# Patient Record
Sex: MALE | Race: BLACK OR AFRICAN AMERICAN | NOT HISPANIC OR LATINO | Employment: FULL TIME | URBAN - METROPOLITAN AREA
[De-identification: names, ages, dates, MRNs, and addresses within clinical notes are randomized per-mention and may not be internally consistent; named-entity substitution may affect disease eponyms.]

---

## 2022-03-21 ENCOUNTER — OFFICE VISIT (OUTPATIENT)
Dept: PRIMARY CARE CLINIC | Facility: CLINIC | Age: 52
End: 2022-03-21
Payer: COMMERCIAL

## 2022-03-21 VITALS
DIASTOLIC BLOOD PRESSURE: 75 MMHG | HEART RATE: 113 BPM | BODY MASS INDEX: 35.95 KG/M2 | OXYGEN SATURATION: 95 % | SYSTOLIC BLOOD PRESSURE: 150 MMHG | TEMPERATURE: 98 F | HEIGHT: 69 IN | WEIGHT: 242.75 LBS

## 2022-03-21 DIAGNOSIS — I10 ELEVATED BLOOD PRESSURE READING IN OFFICE WITH DIAGNOSIS OF HYPERTENSION: ICD-10-CM

## 2022-03-21 DIAGNOSIS — Z80.0 FAMILY HISTORY OF COLON CANCER: ICD-10-CM

## 2022-03-21 DIAGNOSIS — B35.3 TINEA PEDIS OF BOTH FEET: ICD-10-CM

## 2022-03-21 DIAGNOSIS — K21.9 GASTROESOPHAGEAL REFLUX DISEASE, UNSPECIFIED WHETHER ESOPHAGITIS PRESENT: Primary | ICD-10-CM

## 2022-03-21 DIAGNOSIS — I10 ESSENTIAL HYPERTENSION: ICD-10-CM

## 2022-03-21 DIAGNOSIS — Z12.11 SCREENING FOR COLON CANCER: ICD-10-CM

## 2022-03-21 DIAGNOSIS — R01.1 HEART MURMUR: ICD-10-CM

## 2022-03-21 DIAGNOSIS — R00.0 SINUS TACHYCARDIA BY ELECTROCARDIOGRAM: ICD-10-CM

## 2022-03-21 DIAGNOSIS — R42 DIZZINESS: ICD-10-CM

## 2022-03-21 PROCEDURE — 99204 PR OFFICE/OUTPT VISIT, NEW, LEVL IV, 45-59 MIN: ICD-10-PCS | Mod: S$GLB,,, | Performed by: NURSE PRACTITIONER

## 2022-03-21 PROCEDURE — 99999 PR PBB SHADOW E&M-NEW PATIENT-LVL V: CPT | Mod: PBBFAC,,, | Performed by: NURSE PRACTITIONER

## 2022-03-21 PROCEDURE — 99999 PR PBB SHADOW E&M-NEW PATIENT-LVL V: ICD-10-PCS | Mod: PBBFAC,,, | Performed by: NURSE PRACTITIONER

## 2022-03-21 PROCEDURE — 1160F RVW MEDS BY RX/DR IN RCRD: CPT | Mod: CPTII,S$GLB,, | Performed by: NURSE PRACTITIONER

## 2022-03-21 PROCEDURE — 4010F ACE/ARB THERAPY RXD/TAKEN: CPT | Mod: CPTII,S$GLB,, | Performed by: NURSE PRACTITIONER

## 2022-03-21 PROCEDURE — 93010 EKG 12-LEAD: ICD-10-PCS | Mod: S$GLB,,, | Performed by: INTERNAL MEDICINE

## 2022-03-21 PROCEDURE — 99204 OFFICE O/P NEW MOD 45 MIN: CPT | Mod: S$GLB,,, | Performed by: NURSE PRACTITIONER

## 2022-03-21 PROCEDURE — 1159F PR MEDICATION LIST DOCUMENTED IN MEDICAL RECORD: ICD-10-PCS | Mod: CPTII,S$GLB,, | Performed by: NURSE PRACTITIONER

## 2022-03-21 PROCEDURE — 1159F MED LIST DOCD IN RCRD: CPT | Mod: CPTII,S$GLB,, | Performed by: NURSE PRACTITIONER

## 2022-03-21 PROCEDURE — 3077F SYST BP >= 140 MM HG: CPT | Mod: CPTII,S$GLB,, | Performed by: NURSE PRACTITIONER

## 2022-03-21 PROCEDURE — 3078F DIAST BP <80 MM HG: CPT | Mod: CPTII,S$GLB,, | Performed by: NURSE PRACTITIONER

## 2022-03-21 PROCEDURE — 93005 ELECTROCARDIOGRAM TRACING: CPT | Mod: S$GLB,,, | Performed by: NURSE PRACTITIONER

## 2022-03-21 PROCEDURE — 3008F BODY MASS INDEX DOCD: CPT | Mod: CPTII,S$GLB,, | Performed by: NURSE PRACTITIONER

## 2022-03-21 PROCEDURE — 3077F PR MOST RECENT SYSTOLIC BLOOD PRESSURE >= 140 MM HG: ICD-10-PCS | Mod: CPTII,S$GLB,, | Performed by: NURSE PRACTITIONER

## 2022-03-21 PROCEDURE — 3008F PR BODY MASS INDEX (BMI) DOCUMENTED: ICD-10-PCS | Mod: CPTII,S$GLB,, | Performed by: NURSE PRACTITIONER

## 2022-03-21 PROCEDURE — 3078F PR MOST RECENT DIASTOLIC BLOOD PRESSURE < 80 MM HG: ICD-10-PCS | Mod: CPTII,S$GLB,, | Performed by: NURSE PRACTITIONER

## 2022-03-21 PROCEDURE — 1160F PR REVIEW ALL MEDS BY PRESCRIBER/CLIN PHARMACIST DOCUMENTED: ICD-10-PCS | Mod: CPTII,S$GLB,, | Performed by: NURSE PRACTITIONER

## 2022-03-21 PROCEDURE — 93010 ELECTROCARDIOGRAM REPORT: CPT | Mod: S$GLB,,, | Performed by: INTERNAL MEDICINE

## 2022-03-21 PROCEDURE — 4010F PR ACE/ARB THEARPY RXD/TAKEN: ICD-10-PCS | Mod: CPTII,S$GLB,, | Performed by: NURSE PRACTITIONER

## 2022-03-21 PROCEDURE — 93005 EKG 12-LEAD: ICD-10-PCS | Mod: S$GLB,,, | Performed by: NURSE PRACTITIONER

## 2022-03-21 RX ORDER — AMLODIPINE BESYLATE 10 MG/1
10 TABLET ORAL DAILY
COMMUNITY
Start: 2021-10-15 | End: 2022-03-30 | Stop reason: SDUPTHER

## 2022-03-21 RX ORDER — LOSARTAN POTASSIUM 50 MG/1
50 TABLET ORAL DAILY
COMMUNITY
Start: 2021-10-15 | End: 2022-03-30 | Stop reason: SDUPTHER

## 2022-03-21 RX ORDER — IBUPROFEN 800 MG/1
800 TABLET ORAL 2 TIMES DAILY PRN
COMMUNITY
Start: 2021-09-30 | End: 2023-09-19

## 2022-03-21 RX ORDER — OMEPRAZOLE 20 MG/1
20 CAPSULE, DELAYED RELEASE ORAL DAILY
Qty: 30 CAPSULE | Refills: 11 | Status: SHIPPED | OUTPATIENT
Start: 2022-03-21 | End: 2022-03-30

## 2022-03-21 RX ORDER — DICYCLOMINE HYDROCHLORIDE 20 MG/1
TABLET ORAL
COMMUNITY
Start: 2022-03-20

## 2022-03-21 NOTE — PATIENT INSTRUCTIONS
If you experience any chest pain, chest tightness, shortness of breath, severe headache, or your blood pressure is greater than 180/90.    Recommend taking all medications as prescribed.

## 2022-03-21 NOTE — PROGRESS NOTES
Subjective:       Patient ID: Alfa Byrnes Sr. is a 51 y.o. male.    Chief Complaint: Hospital Follow Up     HPI     Mr. Byrnes is a 51 year old male patient that presents to clinic for hospital discharge follow up. Past medical history of hypertension, GERD, tinea pedis.  No past surgical history.  He has no PCP.  New to me.  Previously received primary care at [facility].  Chart review shows emergency room visit at [facility] on 2022 for a primary diagnosis of chest pain and GERD.  Patient reports he experienced stabbing pain that originated in his back and radiated to his chest and abdomen associated with dizziness and lightheadedness. Chart review shows workup was negative.  EKG normal sinus rhythm.  Troponins negative. He was prescribed dicyclomine and pantoprazole. He started purchased otc Nexium because cost of pantoprazole was too expensive.     Hypertension-- blood pressure elevated in clinic today. /75. tachycardia noted, . States he has not taken his Amlodipine 10 mg and Losartan 50 mg since he started taking Nexium two days ago. Denies chest pain, shortness of breath, or palpitations at presents.     Family history of colon cancer (father-). Last colonoscopy was eight years ago by Dr. Mancilla and polyps were found. Due for screening.     Reports tinea pedis bilaterally--treated with Tinactin.      ROS as listed.     Past Medical History:   Diagnosis Date    Essential (primary) hypertension     Gastroesophageal reflux disease without esophagitis     Tinea pedis of both feet       History reviewed. No pertinent surgical history.   Family History   Problem Relation Age of Onset    Colon cancer Father       Review of patient's allergies indicates:   Allergen Reactions    Penicillins Hives     Review of Systems   Cardiovascular: Negative for chest pain, palpitations and leg swelling.   Gastrointestinal: Positive for  "constipation (occassional). Negative for abdominal pain.   Genitourinary: Negative for difficulty urinating.       Objective:       Vitals:    03/21/22 1606 03/21/22 1657   BP: (!) 150/75    BP Location: Left arm    Patient Position: Sitting    BP Method: Thigh Cuff (Automatic)    Pulse: (!) 120 (!) 113   Temp: 98.4 °F (36.9 °C)    TempSrc: Oral    SpO2: 96% 95%   Weight: 110.1 kg (242 lb 11.6 oz)    Height: 5' 9" (1.753 m)       Physical Exam  Vitals and nursing note reviewed.   Constitutional:       General: He is not in acute distress.     Appearance: Normal appearance. He is obese. He is not toxic-appearing.   HENT:      Head: Normocephalic and atraumatic.      Mouth/Throat:      Comments: Mask in place  Eyes:      General: No scleral icterus.     Extraocular Movements: Extraocular movements intact.      Conjunctiva/sclera: Conjunctivae normal.      Pupils: Pupils are equal, round, and reactive to light.   Cardiovascular:      Rate and Rhythm: Regular rhythm. Tachycardia present.      Pulses:           Radial pulses are 2+ on the right side and 2+ on the left side.      Heart sounds: Murmur heard.   Pulmonary:      Effort: Pulmonary effort is normal.      Breath sounds: Normal breath sounds. No wheezing.   Abdominal:      General: Bowel sounds are normal.      Palpations: Abdomen is soft.      Tenderness: There is no abdominal tenderness.   Musculoskeletal:      Right lower leg: No edema.      Left lower leg: No edema.   Skin:     General: Skin is warm and dry.      Capillary Refill: Capillary refill takes less than 2 seconds.      Findings: No rash.   Neurological:      Mental Status: He is alert and oriented to person, place, and time.      Gait: Gait normal.   Psychiatric:         Mood and Affect: Mood normal.         Behavior: Behavior normal.         No results found for: WBC, HGB, HCT, PLT, CHOL, TRIG, HDL, LDLDIRECT, ALT, AST, NA, K, CL, CREATININE, BUN, CO2, TSH, PSA, INR, GLUF, HGBA1C, MICROALBUR "   Assessment:       1. Gastroesophageal reflux disease, unspecified whether esophagitis present    2. Elevated blood pressure reading in office with diagnosis of hypertension    3. Essential hypertension    4. Screening for colon cancer    5. Family history of colon cancer    6. Tinea pedis of both feet    7. Dizziness    8. Heart murmur    9. Sinus tachycardia by electrocardiogram        Plan:       Gastroesophageal reflux disease, unspecified whether esophagitis present  Discussed tips to reduce symptoms associated with GERD.   -     omeprazole (PRILOSEC) 20 MG capsule; Take 1 capsule (20 mg total) by mouth once daily. Thirty minutes prior to meal or drinking coffee or tea  Dispense: 30 capsule; Refill: 11    Elevated blood pressure reading in office with diagnosis of hypertension  -Monitor bp at home.   Return to clinic in 2 weeks for nurse visit for bp check.     Essential hypertension  Continue Amlodipine 10 mg po daily and Losartan 50 mg po daily.   Recommend DASH diet/ low sodium.     Screening for colon cancer  Family hx of colon cancer and personal history of polyps.   -     Case Request Endoscopy: COLONOSCOPY    Family history of colon cancer  -     Case Request Endoscopy: COLONOSCOPY    Tinea pedis of both feet    Dizziness  Patient recently had episodes of chest pain associated with dizziness. Will check echocardiogram and Holter monitor to rule out arrhythmia, valvular disease, or heart failure.   -     IN OFFICE EKG 12-LEAD (to Muse)--reviewed by me in clinic. Sinus tachycardia with premature atrial contractions. No ST elevation noted.   -     Echo Saline Bubble? No; Future; Expected date: 03/21/2022  -     Holter monitor - 48 hour; Future    Heart murmur  -     Echo Saline Bubble? No; Future; Expected date: 03/21/2022    Sinus tachycardia by electrocardiogram      Medication List with Changes/Refills   New Medications    OMEPRAZOLE (PRILOSEC) 20 MG CAPSULE    Take 1 capsule (20 mg total) by mouth once  daily. Thirty minutes prior to meal or drinking coffee or tea   Current Medications    AMLODIPINE (NORVASC) 10 MG TABLET    Take 10 mg by mouth once daily.    DICYCLOMINE (BENTYL) 20 MG TABLET    Take by mouth.    IBUPROFEN (ADVIL,MOTRIN) 800 MG TABLET    Take 800 mg by mouth 2 (two) times daily as needed.    LOSARTAN (COZAAR) 50 MG TABLET    Take 50 mg by mouth once daily.         Follow up in 1 week (on 3/28/2022), or establish care, for with Dr. Dennis or, with Dr. Pandey.     Kimber Reza, DNP, APRN, FNP-C

## 2022-03-30 ENCOUNTER — LAB VISIT (OUTPATIENT)
Dept: PRIMARY CARE CLINIC | Facility: CLINIC | Age: 52
End: 2022-03-30
Payer: COMMERCIAL

## 2022-03-30 ENCOUNTER — OFFICE VISIT (OUTPATIENT)
Dept: PRIMARY CARE CLINIC | Facility: CLINIC | Age: 52
End: 2022-03-30
Payer: COMMERCIAL

## 2022-03-30 VITALS
HEIGHT: 69 IN | RESPIRATION RATE: 18 BRPM | HEART RATE: 78 BPM | TEMPERATURE: 98 F | WEIGHT: 241.06 LBS | SYSTOLIC BLOOD PRESSURE: 132 MMHG | BODY MASS INDEX: 35.7 KG/M2 | DIASTOLIC BLOOD PRESSURE: 83 MMHG | OXYGEN SATURATION: 98 %

## 2022-03-30 DIAGNOSIS — Z12.11 SCREEN FOR COLON CANCER: ICD-10-CM

## 2022-03-30 DIAGNOSIS — Z23 NEED FOR SHINGLES VACCINE: ICD-10-CM

## 2022-03-30 DIAGNOSIS — Z80.0 FAMILY HISTORY OF COLON CANCER: ICD-10-CM

## 2022-03-30 DIAGNOSIS — I10 PRIMARY HYPERTENSION: ICD-10-CM

## 2022-03-30 DIAGNOSIS — Z11.59 NEED FOR HEPATITIS C SCREENING TEST: ICD-10-CM

## 2022-03-30 DIAGNOSIS — Z11.4 ENCOUNTER FOR SCREENING FOR HIV: ICD-10-CM

## 2022-03-30 DIAGNOSIS — K21.9 GASTROESOPHAGEAL REFLUX DISEASE WITHOUT ESOPHAGITIS: Primary | ICD-10-CM

## 2022-03-30 LAB
CHOLEST SERPL-MCNC: 142 MG/DL (ref 120–199)
CHOLEST/HDLC SERPL: 4.7 {RATIO} (ref 2–5)
ESTIMATED AVG GLUCOSE: 131 MG/DL (ref 68–131)
HBA1C MFR BLD: 6.2 % (ref 4–5.6)
HDLC SERPL-MCNC: 30 MG/DL (ref 40–75)
HDLC SERPL: 21.1 % (ref 20–50)
LDLC SERPL CALC-MCNC: 100.6 MG/DL (ref 63–159)
NONHDLC SERPL-MCNC: 112 MG/DL
TRIGL SERPL-MCNC: 57 MG/DL (ref 30–150)

## 2022-03-30 PROCEDURE — 3075F PR MOST RECENT SYSTOLIC BLOOD PRESS GE 130-139MM HG: ICD-10-PCS | Mod: CPTII,S$GLB,, | Performed by: FAMILY MEDICINE

## 2022-03-30 PROCEDURE — 3008F PR BODY MASS INDEX (BMI) DOCUMENTED: ICD-10-PCS | Mod: CPTII,S$GLB,, | Performed by: FAMILY MEDICINE

## 2022-03-30 PROCEDURE — 3079F PR MOST RECENT DIASTOLIC BLOOD PRESSURE 80-89 MM HG: ICD-10-PCS | Mod: CPTII,S$GLB,, | Performed by: FAMILY MEDICINE

## 2022-03-30 PROCEDURE — 3075F SYST BP GE 130 - 139MM HG: CPT | Mod: CPTII,S$GLB,, | Performed by: FAMILY MEDICINE

## 2022-03-30 PROCEDURE — 36415 PR COLLECTION VENOUS BLOOD,VENIPUNCTURE: ICD-10-PCS | Mod: S$GLB,,, | Performed by: FAMILY MEDICINE

## 2022-03-30 PROCEDURE — 83036 HEMOGLOBIN GLYCOSYLATED A1C: CPT | Performed by: FAMILY MEDICINE

## 2022-03-30 PROCEDURE — 1159F MED LIST DOCD IN RCRD: CPT | Mod: CPTII,S$GLB,, | Performed by: FAMILY MEDICINE

## 2022-03-30 PROCEDURE — 99999 PR PBB SHADOW E&M-EST. PATIENT-LVL III: ICD-10-PCS | Mod: PBBFAC,,, | Performed by: FAMILY MEDICINE

## 2022-03-30 PROCEDURE — 1160F RVW MEDS BY RX/DR IN RCRD: CPT | Mod: CPTII,S$GLB,, | Performed by: FAMILY MEDICINE

## 2022-03-30 PROCEDURE — 3008F BODY MASS INDEX DOCD: CPT | Mod: CPTII,S$GLB,, | Performed by: FAMILY MEDICINE

## 2022-03-30 PROCEDURE — 4010F ACE/ARB THERAPY RXD/TAKEN: CPT | Mod: CPTII,S$GLB,, | Performed by: FAMILY MEDICINE

## 2022-03-30 PROCEDURE — 99999 PR PBB SHADOW E&M-EST. PATIENT-LVL III: CPT | Mod: PBBFAC,,, | Performed by: FAMILY MEDICINE

## 2022-03-30 PROCEDURE — 3079F DIAST BP 80-89 MM HG: CPT | Mod: CPTII,S$GLB,, | Performed by: FAMILY MEDICINE

## 2022-03-30 PROCEDURE — 1159F PR MEDICATION LIST DOCUMENTED IN MEDICAL RECORD: ICD-10-PCS | Mod: CPTII,S$GLB,, | Performed by: FAMILY MEDICINE

## 2022-03-30 PROCEDURE — 86803 HEPATITIS C AB TEST: CPT | Performed by: FAMILY MEDICINE

## 2022-03-30 PROCEDURE — 99214 PR OFFICE/OUTPT VISIT, EST, LEVL IV, 30-39 MIN: ICD-10-PCS | Mod: S$GLB,,, | Performed by: FAMILY MEDICINE

## 2022-03-30 PROCEDURE — 1160F PR REVIEW ALL MEDS BY PRESCRIBER/CLIN PHARMACIST DOCUMENTED: ICD-10-PCS | Mod: CPTII,S$GLB,, | Performed by: FAMILY MEDICINE

## 2022-03-30 PROCEDURE — 87389 HIV-1 AG W/HIV-1&-2 AB AG IA: CPT | Performed by: FAMILY MEDICINE

## 2022-03-30 PROCEDURE — 80061 LIPID PANEL: CPT | Performed by: FAMILY MEDICINE

## 2022-03-30 PROCEDURE — 36415 COLL VENOUS BLD VENIPUNCTURE: CPT | Mod: S$GLB,,, | Performed by: FAMILY MEDICINE

## 2022-03-30 PROCEDURE — 99214 OFFICE O/P EST MOD 30 MIN: CPT | Mod: S$GLB,,, | Performed by: FAMILY MEDICINE

## 2022-03-30 PROCEDURE — 4010F PR ACE/ARB THEARPY RXD/TAKEN: ICD-10-PCS | Mod: CPTII,S$GLB,, | Performed by: FAMILY MEDICINE

## 2022-03-30 RX ORDER — LOSARTAN POTASSIUM 50 MG/1
50 TABLET ORAL DAILY
Qty: 90 TABLET | Refills: 1 | Status: SHIPPED | OUTPATIENT
Start: 2022-03-30 | End: 2023-09-19 | Stop reason: SDUPTHER

## 2022-03-30 RX ORDER — HYDROGEN PEROXIDE 3 %
40 SOLUTION, NON-ORAL MISCELLANEOUS
COMMUNITY
End: 2023-10-15

## 2022-03-30 RX ORDER — PANTOPRAZOLE SODIUM 20 MG/1
20 TABLET, DELAYED RELEASE ORAL
COMMUNITY
Start: 2022-03-20 | End: 2022-03-30

## 2022-03-30 RX ORDER — AMLODIPINE BESYLATE 10 MG/1
10 TABLET ORAL DAILY
Qty: 90 TABLET | Refills: 1 | Status: SHIPPED | OUTPATIENT
Start: 2022-03-30 | End: 2023-09-19

## 2022-03-30 RX ORDER — ZOSTER VACCINE RECOMBINANT, ADJUVANTED 50 MCG/0.5
0.5 KIT INTRAMUSCULAR ONCE
Qty: 1 EACH | Refills: 0 | Status: SHIPPED | OUTPATIENT
Start: 2022-03-30 | End: 2022-03-30

## 2022-03-30 NOTE — PROGRESS NOTES
Subjective:       Patient ID: Alfa Byrnes Sr. is a 51 y.o. male.    Chief Complaint: Establish Care    52 yo M pt, new to me, with PMH significant for HTN, GERD, and family history of colon cancer. He presents to [facility]. He gives h/o ED visit 2022 for CP and concern for MI.  ACS ruled out and pt diagnosed with GERD. Discharged with pantoprazole 40 mg--- not covered by insurance. He started taking OTC nexium 2 tabs daily with significant relief. Has not had GERD symptoms since starting Nexium. Has not been back to work. Feeling fine. Would like to return today.       Past Medical History:   Diagnosis Date    Essential (primary) hypertension     Gastroesophageal reflux disease without esophagitis     Tinea pedis of both feet        Patient Active Problem List   Diagnosis    Gastroesophageal reflux disease without esophagitis    Primary hypertension    Family history of colon cancer       Past Surgical History:   Procedure Laterality Date    NO PAST SURGERIES         Family History   Problem Relation Age of Onset    Hypertension Mother     Diabetes type II Mother     Colon cancer Father          at 49 yo       Social History     Tobacco Use   Smoking Status Never Smoker   Smokeless Tobacco Never Used       Social History     Social History Narrative    Tobacco: None    EtOH: 1-2x per week has 1-2 drinks     Drug: None    Employment: [employer] work    Education: 3 years of college    Lives with wife and 23 yo daughter     19 yo son is studying at [employer]    28 yo son in [place], [place]       Medications have been reviewed and reconciled.     Review of patient's allergies indicates:   Allergen Reactions    Penicillins Hives        Review of Systems   Constitutional: Negative for activity change, appetite change, chills, fever and unexpected weight change.   HENT: Negative for congestion, sinus pressure, sinus pain and sore throat.    Eyes: Negative for redness, itching and visual  "disturbance.   Respiratory: Negative for cough, shortness of breath and wheezing.    Cardiovascular: Negative for chest pain.   Gastrointestinal: Negative for abdominal pain, constipation, diarrhea, nausea and vomiting.   Genitourinary: Negative for difficulty urinating, dysuria, frequency, penile discharge and urgency.   Skin: Negative for rash.   Neurological: Negative for dizziness, syncope, weakness and headaches.   Psychiatric/Behavioral: Negative for dysphoric mood and suicidal ideas. The patient is not nervous/anxious.          Objective:        /83 (BP Location: Left arm, Patient Position: Sitting, BP Method: Large (Automatic))   Pulse 78   Temp 98.2 °F (36.8 °C) (Oral)   Resp 18   Ht 5' 9" (1.753 m)   Wt 109.4 kg (241 lb 1.2 oz)   SpO2 98%   BMI 35.60 kg/m²      Physical Exam  Constitutional:       General: He is not in acute distress.     Appearance: He is well-developed.   HENT:      Head: Normocephalic and atraumatic.      Right Ear: External ear normal.      Left Ear: External ear normal.   Eyes:      General: No scleral icterus.     Extraocular Movements: Extraocular movements intact.      Conjunctiva/sclera: Conjunctivae normal.   Cardiovascular:      Rate and Rhythm: Normal rate and regular rhythm.      Heart sounds: Normal heart sounds. No murmur heard.    No friction rub. No gallop.   Pulmonary:      Effort: Pulmonary effort is normal. No respiratory distress.      Breath sounds: Normal breath sounds. No wheezing or rales.   Musculoskeletal:         General: No tenderness or deformity. Normal range of motion.      Cervical back: Normal range of motion.      Right lower leg: No edema.      Left lower leg: No edema.   Skin:     General: Skin is warm and dry.      Findings: No erythema or rash.   Neurological:      Mental Status: He is alert and oriented to person, place, and time.      Cranial Nerves: No cranial nerve deficit.      Motor: No abnormal muscle tone.   Psychiatric:         " Mood and Affect: Mood normal.         Behavior: Behavior normal.         Assessment:       1. Gastroesophageal reflux disease without esophagitis    2. Primary hypertension    3. Family history of colon cancer    4. Screen for colon cancer    5. Need for hepatitis C screening test    6. Encounter for screening for HIV    7. Need for shingles vaccine        Plan:       Alfa was seen today for establish care.    Diagnoses and all orders for this visit:    Gastroesophageal reflux disease without esophagitis    Primary hypertension  -     amLODIPine (NORVASC) 10 MG tablet; Take 1 tablet (10 mg total) by mouth once daily.  -     losartan (COZAAR) 50 MG tablet; Take 1 tablet (50 mg total) by mouth once daily.  -     Hemoglobin A1C; Future  -     Lipid Panel; Future    Family history of colon cancer    Screen for colon cancer    Need for hepatitis C screening test  -     Hepatitis C Antibody; Future    Encounter for screening for HIV  -     HIV 1/2 Ag/Ab (4th Gen); Future  -     Case Request Endoscopy: COLONOSCOPY    Need for shingles vaccine  -     varicella-zoster [id number], PF, (SHINGRIX, PF,) 50 mcg/0.5 mL injection; Inject 0.5 mLs into the muscle once. for 1 dose    GERD  Anti-reflux diet/lifestyle modifications discussed  Advised Nexium 40 mg daily x 2-4 weeks and then step down to 1 tab daily  Note to return to work completed    HTN   BP at target   Continue amlodipine 10 mg daily + Losartan 50 mg daily   CBC and CMP normal 03/19/22  A1c and FLP today 03/30/22    Family h/o colonoscopy   Had initial C-scope at 44 yo; normal per pt  Will refer for C-scope today     HM   Hep C and HIV screens with fasting labs   Shigrix rx'd to pharmacy  Deferred Td/Tdap to next visit  COVID-19 booster encouraged    F/U 3-6 months pending lab results    I spent a total of 30 minutes on the day of the visit.This includes face to face time and non-face to face time preparing to see the patient (eg, review of tests), obtaining and/or  reviewing separately obtained history, documenting clinical information in the electronic or other health record, independently interpreting results and communicating results to the patient/family/caregiver, or care coordinator.

## 2022-03-30 NOTE — LETTER
March 30, 2022      [facility]  [address]  [facility] [address]  Phone: [phone]  Fax: [phone]       Patient: Alfa Byrnes   YOB: 1970  Date of Visit: 03/30/2022    To Whom It May Concern:    Alejandra Byrnes  was at [facility] on 03/30/2022. The patient may return to work today 03/30/2022 with no restrictions. If you have any questions or concerns, or if I can be of further assistance, please do not hesitate to contact me.    Sincerely,        Gonzalo Pandey MD

## 2022-03-31 LAB
HCV AB SERPL QL IA: NEGATIVE
HIV 1+2 AB+HIV1 P24 AG SERPL QL IA: NEGATIVE

## 2022-04-02 ENCOUNTER — PATIENT MESSAGE (OUTPATIENT)
Dept: PRIMARY CARE CLINIC | Facility: CLINIC | Age: 52
End: 2022-04-02
Payer: COMMERCIAL

## 2022-04-10 ENCOUNTER — PATIENT MESSAGE (OUTPATIENT)
Dept: PRIMARY CARE CLINIC | Facility: CLINIC | Age: 52
End: 2022-04-10
Payer: COMMERCIAL

## 2022-04-11 ENCOUNTER — HOSPITAL ENCOUNTER (OUTPATIENT)
Dept: CARDIOLOGY | Facility: HOSPITAL | Age: 52
Discharge: HOME | End: 2022-04-11
Attending: NURSE PRACTITIONER
Payer: COMMERCIAL

## 2022-04-11 VITALS
HEART RATE: 70 BPM | HEIGHT: 69 IN | BODY MASS INDEX: 35.7 KG/M2 | SYSTOLIC BLOOD PRESSURE: 158 MMHG | WEIGHT: 241 LBS | DIASTOLIC BLOOD PRESSURE: 88 MMHG

## 2022-04-11 DIAGNOSIS — R42 DIZZINESS: ICD-10-CM

## 2022-04-11 DIAGNOSIS — R01.1 HEART MURMUR: ICD-10-CM

## 2022-04-11 PROCEDURE — 93306 TTE W/DOPPLER COMPLETE: CPT | Mod: 26,,, | Performed by: INTERNAL MEDICINE

## 2022-04-11 PROCEDURE — 93306 TTE W/DOPPLER COMPLETE: CPT

## 2022-04-11 PROCEDURE — 93306 ECHO (CUPID ONLY): ICD-10-PCS | Mod: 26,,, | Performed by: INTERNAL MEDICINE

## 2022-04-12 LAB
ASCENDING AORTA: 4.56 CM
AV INDEX (PROSTH): 1.03
AV MEAN GRADIENT: 6 MMHG
AV PEAK GRADIENT: 11 MMHG
AV VALVE AREA: 4.18 CM2
AV VELOCITY RATIO: 0.93
BSA FOR ECHO PROCEDURE: 2.31 M2
CV ECHO LV RWT: 0.49 CM
DOP CALC AO PEAK VEL: 1.65 M/S
DOP CALC AO VTI: 29.44 CM
DOP CALC LVOT AREA: 4 CM2
DOP CALC LVOT DIAMETER: 2.27 CM
DOP CALC LVOT PEAK VEL: 1.54 M/S
DOP CALC LVOT STROKE VOLUME: 122.97 CM3
DOP CALCLVOT PEAK VEL VTI: 30.4 CM
E WAVE DECELERATION TIME: 254.38 MSEC
E/A RATIO: 0.83
E/E' RATIO: 5.3 M/S
ECHO LV POSTERIOR WALL: 0.91 CM (ref 0.6–1.1)
EJECTION FRACTION: 65 %
FRACTIONAL SHORTENING: 36 % (ref 28–44)
INTERVENTRICULAR SEPTUM: 1.21 CM (ref 0.6–1.1)
LA MAJOR: 3.99 CM
LA MINOR: 4.45 CM
LA WIDTH: 3.21 CM
LEFT ATRIUM SIZE: 3.08 CM
LEFT ATRIUM VOLUME INDEX MOD: 16.2 ML/M2
LEFT ATRIUM VOLUME INDEX: 15.8 ML/M2
LEFT ATRIUM VOLUME MOD: 36.29 CM3
LEFT ATRIUM VOLUME: 35.36 CM3
LEFT INTERNAL DIMENSION IN SYSTOLE: 2.36 CM (ref 2.1–4)
LEFT VENTRICLE DIASTOLIC VOLUME INDEX: 26.14 ML/M2
LEFT VENTRICLE DIASTOLIC VOLUME: 58.55 ML
LEFT VENTRICLE MASS INDEX: 55 G/M2
LEFT VENTRICLE SYSTOLIC VOLUME INDEX: 8.6 ML/M2
LEFT VENTRICLE SYSTOLIC VOLUME: 19.25 ML
LEFT VENTRICULAR INTERNAL DIMENSION IN DIASTOLE: 3.71 CM (ref 3.5–6)
LEFT VENTRICULAR MASS: 122.98 G
LV LATERAL E/E' RATIO: 3.79 M/S
LV SEPTAL E/E' RATIO: 8.83 M/S
MV A" WAVE DURATION": 9.99 MSEC
MV PEAK A VEL: 0.64 M/S
MV PEAK E VEL: 0.53 M/S
MV STENOSIS PRESSURE HALF TIME: 73.77 MS
MV VALVE AREA P 1/2 METHOD: 2.98 CM2
PISA TR MAX VEL: 1.88 M/S
PULM VEIN S/D RATIO: 1.3
PV PEAK D VEL: 0.3 M/S
PV PEAK S VEL: 0.39 M/S
RA MAJOR: 4 CM
RA WIDTH: 2.52 CM
RIGHT VENTRICULAR END-DIASTOLIC DIMENSION: 3.22 CM
RV TISSUE DOPPLER FREE WALL SYSTOLIC VELOCITY 1 (APICAL 4 CHAMBER VIEW): 9.14 CM/S
SINUS: 3.83 CM
STJ: 3.33 CM
TDI LATERAL: 0.14 M/S
TDI SEPTAL: 0.06 M/S
TDI: 0.1 M/S
TR MAX PG: 14 MMHG
TRICUSPID ANNULAR PLANE SYSTOLIC EXCURSION: 1.57 CM

## 2022-04-28 ENCOUNTER — TELEPHONE (OUTPATIENT)
Dept: PRIMARY CARE CLINIC | Facility: CLINIC | Age: 52
End: 2022-04-28
Payer: COMMERCIAL

## 2022-04-28 ENCOUNTER — PATIENT MESSAGE (OUTPATIENT)
Dept: PRIMARY CARE CLINIC | Facility: CLINIC | Age: 52
End: 2022-04-28
Payer: COMMERCIAL

## 2022-04-28 DIAGNOSIS — R01.1 MURMUR: ICD-10-CM

## 2022-04-28 DIAGNOSIS — R93.1 ABNORMAL ECHOCARDIOGRAM: Primary | ICD-10-CM

## 2022-05-10 ENCOUNTER — OFFICE VISIT (OUTPATIENT)
Dept: CARDIOLOGY | Facility: CLINIC | Age: 52
End: 2022-05-10
Payer: COMMERCIAL

## 2022-05-10 VITALS
SYSTOLIC BLOOD PRESSURE: 112 MMHG | HEIGHT: 69 IN | HEART RATE: 100 BPM | DIASTOLIC BLOOD PRESSURE: 78 MMHG | OXYGEN SATURATION: 98 % | BODY MASS INDEX: 35.68 KG/M2 | WEIGHT: 240.88 LBS

## 2022-05-10 DIAGNOSIS — R01.1 MURMUR: ICD-10-CM

## 2022-05-10 DIAGNOSIS — I10 PRIMARY HYPERTENSION: ICD-10-CM

## 2022-05-10 DIAGNOSIS — R93.1 ABNORMAL ECHOCARDIOGRAM: ICD-10-CM

## 2022-05-10 DIAGNOSIS — R94.31 NONSPECIFIC ABNORMAL ELECTROCARDIOGRAM (ECG) (EKG): ICD-10-CM

## 2022-05-10 DIAGNOSIS — R07.9 CHEST PAIN, UNSPECIFIED TYPE: Primary | ICD-10-CM

## 2022-05-10 PROCEDURE — 99999 PR PBB SHADOW E&M-EST. PATIENT-LVL III: CPT | Mod: PBBFAC,,, | Performed by: INTERNAL MEDICINE

## 2022-05-10 PROCEDURE — 99204 OFFICE O/P NEW MOD 45 MIN: CPT | Mod: S$GLB,,, | Performed by: INTERNAL MEDICINE

## 2022-05-10 PROCEDURE — 3008F BODY MASS INDEX DOCD: CPT | Mod: CPTII,S$GLB,, | Performed by: INTERNAL MEDICINE

## 2022-05-10 PROCEDURE — 3008F PR BODY MASS INDEX (BMI) DOCUMENTED: ICD-10-PCS | Mod: CPTII,S$GLB,, | Performed by: INTERNAL MEDICINE

## 2022-05-10 PROCEDURE — 3074F SYST BP LT 130 MM HG: CPT | Mod: CPTII,S$GLB,, | Performed by: INTERNAL MEDICINE

## 2022-05-10 PROCEDURE — 3074F PR MOST RECENT SYSTOLIC BLOOD PRESSURE < 130 MM HG: ICD-10-PCS | Mod: CPTII,S$GLB,, | Performed by: INTERNAL MEDICINE

## 2022-05-10 PROCEDURE — 4010F PR ACE/ARB THEARPY RXD/TAKEN: ICD-10-PCS | Mod: CPTII,S$GLB,, | Performed by: INTERNAL MEDICINE

## 2022-05-10 PROCEDURE — 1159F MED LIST DOCD IN RCRD: CPT | Mod: CPTII,S$GLB,, | Performed by: INTERNAL MEDICINE

## 2022-05-10 PROCEDURE — 3078F PR MOST RECENT DIASTOLIC BLOOD PRESSURE < 80 MM HG: ICD-10-PCS | Mod: CPTII,S$GLB,, | Performed by: INTERNAL MEDICINE

## 2022-05-10 PROCEDURE — 99999 PR PBB SHADOW E&M-EST. PATIENT-LVL III: ICD-10-PCS | Mod: PBBFAC,,, | Performed by: INTERNAL MEDICINE

## 2022-05-10 PROCEDURE — 99204 PR OFFICE/OUTPT VISIT, NEW, LEVL IV, 45-59 MIN: ICD-10-PCS | Mod: S$GLB,,, | Performed by: INTERNAL MEDICINE

## 2022-05-10 PROCEDURE — 1159F PR MEDICATION LIST DOCUMENTED IN MEDICAL RECORD: ICD-10-PCS | Mod: CPTII,S$GLB,, | Performed by: INTERNAL MEDICINE

## 2022-05-10 PROCEDURE — 4010F ACE/ARB THERAPY RXD/TAKEN: CPT | Mod: CPTII,S$GLB,, | Performed by: INTERNAL MEDICINE

## 2022-05-10 PROCEDURE — 3078F DIAST BP <80 MM HG: CPT | Mod: CPTII,S$GLB,, | Performed by: INTERNAL MEDICINE

## 2022-05-10 PROCEDURE — 3044F PR MOST RECENT HEMOGLOBIN A1C LEVEL <7.0%: ICD-10-PCS | Mod: CPTII,S$GLB,, | Performed by: INTERNAL MEDICINE

## 2022-05-10 PROCEDURE — 3044F HG A1C LEVEL LT 7.0%: CPT | Mod: CPTII,S$GLB,, | Performed by: INTERNAL MEDICINE

## 2022-05-10 NOTE — PROGRESS NOTES
Cardiology    5/10/2022  1:23 PM    Problem list  Patient Active Problem List   Diagnosis    Gastroesophageal reflux disease without esophagitis    Primary hypertension    Family history of colon cancer    Nonspecific abnormal electrocardiogram (ECG) (EKG)       CC:  Cp    HPI:  Referred for evaluation of chest pain.  On March 19, while driving, he felt pain started in his back and radiating around to his chest.  He went to [facility] ER.  He was evaluated with negative troponin.  He was given treatment for reflux which improved.  He had EKG done which showed abnormality with inferior Q-waves.  He was referred for further evaluation.  He does not exercise.  He denies any exertional chest pain.  He denies any dyspnea on exertion, paroxysmal nocturnal dyspnea, palpitation or syncope.  His blood pressure is well controlled.  He has no diabetes or hyperlipidemia.  She does not smoke.  He has no family history for premature coronary disease.    Medications  Current Outpatient Medications   Medication Sig Dispense Refill    amLODIPine (NORVASC) 10 MG tablet Take 1 tablet (10 mg total) by mouth once daily. 90 tablet 1    dicyclomine (BENTYL) 20 mg tablet Take by mouth.      esomeprazole (NEXIUM) 20 MG capsule Take 40 mg by mouth before breakfast. Taking OTC      ibuprofen (ADVIL,MOTRIN) 800 MG tablet Take 800 mg by mouth 2 (two) times daily as needed.      losartan (COZAAR) 50 MG tablet Take 1 tablet (50 mg total) by mouth once daily. 90 tablet 1     No current facility-administered medications for this visit.      Prior to Admission medications    Medication Sig Start Date End Date Taking? Authorizing Provider   amLODIPine (NORVASC) 10 MG tablet Take 1 tablet (10 mg total) by mouth once daily. 3/30/22  Yes Gonzalo Pandey MD   dicyclomine (BENTYL) 20 mg tablet Take by mouth. 3/20/22  Yes Historical Provider   esomeprazole (NEXIUM) 20 MG capsule Take 40 mg by mouth before breakfast. Taking OTC   Yes  Historical Provider   ibuprofen (ADVIL,MOTRIN) 800 MG tablet Take 800 mg by mouth 2 (two) times daily as needed. 9/30/21  Yes Historical Provider   losartan (COZAAR) 50 MG tablet Take 1 tablet (50 mg total) by mouth once daily. 3/30/22  Yes Gonzalo Pandey MD         History  Past Medical History:   Diagnosis Date    Essential (primary) hypertension     Gastroesophageal reflux disease without esophagitis     Tinea pedis of both feet      Past Surgical History:   Procedure Laterality Date    NO PAST SURGERIES       Social History     Socioeconomic History    Marital status:    Tobacco Use    Smoking status: Never Smoker    Smokeless tobacco: Never Used   Substance and Sexual Activity    Alcohol use: Yes     Comment: occasionally (1-2 x  per week; 2 drinks max)    Drug use: Never    Sexual activity: Not Currently     Partners: Female     Birth control/protection: None   Social History Narrative    Tobacco: None    EtOH: 1-2x per week has 1-2 drinks     Drug: None    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   HENT: Negative for congestion and nosebleeds.    Eyes: Negative for double vision, vision loss in left eye, vision loss in right eye and visual disturbance.   Cardiovascular: Positive for chest pain. Negative for claudication, cyanosis, dyspnea on exertion, irregular heartbeat, leg swelling, near-syncope, orthopnea, palpitations, paroxysmal nocturnal dyspnea and syncope.   Respiratory: Negative for cough, hemoptysis, shortness of breath, sleep disturbances due to breathing, snoring, sputum production and wheezing.    Endocrine: Negative for cold  intolerance and heat intolerance.   Skin: Negative for nail changes and rash.   Musculoskeletal: Negative for joint pain, muscle cramps, muscle weakness and myalgias.   Gastrointestinal: Negative for change in bowel habit, heartburn, hematemesis, hematochezia, hemorrhoids and melena.   Neurological: Negative for dizziness, focal weakness and headaches.         Physical Exam  Wt Readings from Last 1 Encounters:   05/10/22 109.2 kg (240 lb 13.6 oz)     BP Readings from Last 3 Encounters:   05/10/22 112/78   04/11/22 (!) 158/88   03/30/22 132/83     Pulse Readings from Last 1 Encounters:   05/10/22 100     Body mass index is 35.57 kg/m².    Physical Exam  Vitals reviewed.   Neck:      Vascular: No carotid bruit or JVD.   Cardiovascular:      Rate and Rhythm: Normal rate and regular rhythm.      Pulses:           Carotid pulses are 2+ on the right side and 2+ on the left side.       Radial pulses are 2+ on the right side and 2+ on the left side.      Heart sounds: Normal heart sounds, S1 normal and S2 normal.   Musculoskeletal:      Right lower leg: No edema.      Left lower leg: No edema.   Neurological:      Mental Status: He is alert.             Assessment  1. Abnormal echocardiogram  Inferior Q-waves  - Ambulatory referral/consult to Cardiology    2. Murmur  Stable  - Ambulatory referral/consult to Cardiology    3. Chest pain, unspecified type  Being evaluate  - Nuclear Stress - Cardiology Interpreted; Future    4. Nonspecific abnormal electrocardiogram (ECG) (EKG)  Being evaluate  - Nuclear Stress - Cardiology Interpreted; Future    5. Primary hypertension  Well controlled medications        Plan and Discussion  Discussed his atypical chest pain symptoms.  Discussed his abnormal EKG which showed inferior Q-waves.  Discussed proceeding with a treadmill nuclear stress test to evaluate his chest pain with abnormal EKG.  Discussed his echocardiogram results.    Follow Up  1 month      Corey Dennis MD, F.A.C.C,  JUNIE

## 2022-05-23 ENCOUNTER — HOSPITAL ENCOUNTER (OUTPATIENT)
Dept: RADIOLOGY | Facility: OTHER | Age: 52
Discharge: HOME | End: 2022-05-23
Attending: INTERNAL MEDICINE
Payer: COMMERCIAL

## 2022-05-23 ENCOUNTER — HOSPITAL ENCOUNTER (OUTPATIENT)
Dept: CARDIOLOGY | Facility: OTHER | Age: 52
Discharge: HOME | End: 2022-05-23
Attending: INTERNAL MEDICINE
Payer: COMMERCIAL

## 2022-05-23 DIAGNOSIS — R94.31 NONSPECIFIC ABNORMAL ELECTROCARDIOGRAM (ECG) (EKG): ICD-10-CM

## 2022-05-23 DIAGNOSIS — R07.9 CHEST PAIN, UNSPECIFIED TYPE: ICD-10-CM

## 2022-05-23 LAB
CV STRESS BASE HR: 71 BPM
DIASTOLIC BLOOD PRESSURE: 93 MMHG
NUC REST EJECTION FRACTION: 68
NUC STRESS EJECTION FRACTION: 74 %
OHS CV CPX 1 MINUTE RECOVERY HEART RATE: 133 BPM
OHS CV CPX 85 PERCENT MAX PREDICTED HEART RATE MALE: 143
OHS CV CPX ESTIMATED METS: 10
OHS CV CPX MAX PREDICTED HEART RATE: 168
OHS CV CPX PATIENT IS FEMALE: 0
OHS CV CPX PATIENT IS MALE: 1
OHS CV CPX PEAK DIASTOLIC BLOOD PRESSURE: 97 MMHG
OHS CV CPX PEAK HEAR RATE: 166 BPM
OHS CV CPX PEAK RATE PRESSURE PRODUCT: NORMAL
OHS CV CPX PEAK SYSTOLIC BLOOD PRESSURE: 213 MMHG
OHS CV CPX PERCENT MAX PREDICTED HEART RATE ACHIEVED: 99
OHS CV CPX RATE PRESSURE PRODUCT PRESENTING: NORMAL
STRESS ANGINA INDEX: 0
STRESS DUKE TREADMILL SCORE: 2
STRESS ECHO POST EXERCISE DUR MIN: 7 MINUTES
STRESS ECHO POST EXERCISE DUR SEC: 16 SECONDS
STRESS ST DEPRESSION: 1 MM
STRESS ST ELEVATION: 1 MM
SYSTOLIC BLOOD PRESSURE: 146 MMHG

## 2022-05-23 PROCEDURE — 78452 HT MUSCLE IMAGE SPECT MULT: CPT | Mod: 26,,, | Performed by: INTERNAL MEDICINE

## 2022-05-23 PROCEDURE — 78452 STRESS TEST WITH MYOCARDIAL PERFUSION (CUPID ONLY): ICD-10-PCS | Mod: 26,,, | Performed by: INTERNAL MEDICINE

## 2022-05-23 PROCEDURE — 93018 STRESS TEST WITH MYOCARDIAL PERFUSION (CUPID ONLY): ICD-10-PCS | Mod: ,,, | Performed by: INTERNAL MEDICINE

## 2022-05-23 PROCEDURE — A9500 TC99M SESTAMIBI: HCPCS

## 2022-05-23 PROCEDURE — 93016 STRESS TEST WITH MYOCARDIAL PERFUSION (CUPID ONLY): ICD-10-PCS | Mod: ,,, | Performed by: INTERNAL MEDICINE

## 2022-05-23 PROCEDURE — 93018 CV STRESS TEST I&R ONLY: CPT | Mod: ,,, | Performed by: INTERNAL MEDICINE

## 2022-05-23 PROCEDURE — 93016 CV STRESS TEST SUPVJ ONLY: CPT | Mod: ,,, | Performed by: INTERNAL MEDICINE

## 2022-05-25 ENCOUNTER — OFFICE VISIT (OUTPATIENT)
Dept: CARDIOLOGY | Facility: CLINIC | Age: 52
End: 2022-05-25
Payer: COMMERCIAL

## 2022-05-25 VITALS
DIASTOLIC BLOOD PRESSURE: 92 MMHG | BODY MASS INDEX: 36.24 KG/M2 | HEART RATE: 97 BPM | HEIGHT: 69 IN | SYSTOLIC BLOOD PRESSURE: 156 MMHG | WEIGHT: 244.69 LBS | OXYGEN SATURATION: 98 %

## 2022-05-25 DIAGNOSIS — R94.31 NONSPECIFIC ABNORMAL ELECTROCARDIOGRAM (ECG) (EKG): Primary | ICD-10-CM

## 2022-05-25 DIAGNOSIS — I10 PRIMARY HYPERTENSION: ICD-10-CM

## 2022-05-25 PROCEDURE — 1159F PR MEDICATION LIST DOCUMENTED IN MEDICAL RECORD: ICD-10-PCS | Mod: CPTII,S$GLB,, | Performed by: INTERNAL MEDICINE

## 2022-05-25 PROCEDURE — 99999 PR PBB SHADOW E&M-EST. PATIENT-LVL III: CPT | Mod: PBBFAC,,, | Performed by: INTERNAL MEDICINE

## 2022-05-25 PROCEDURE — 4010F ACE/ARB THERAPY RXD/TAKEN: CPT | Mod: CPTII,S$GLB,, | Performed by: INTERNAL MEDICINE

## 2022-05-25 PROCEDURE — 3044F PR MOST RECENT HEMOGLOBIN A1C LEVEL <7.0%: ICD-10-PCS | Mod: CPTII,S$GLB,, | Performed by: INTERNAL MEDICINE

## 2022-05-25 PROCEDURE — 3044F HG A1C LEVEL LT 7.0%: CPT | Mod: CPTII,S$GLB,, | Performed by: INTERNAL MEDICINE

## 2022-05-25 PROCEDURE — 3080F DIAST BP >= 90 MM HG: CPT | Mod: CPTII,S$GLB,, | Performed by: INTERNAL MEDICINE

## 2022-05-25 PROCEDURE — 99213 PR OFFICE/OUTPT VISIT, EST, LEVL III, 20-29 MIN: ICD-10-PCS | Mod: S$GLB,,, | Performed by: INTERNAL MEDICINE

## 2022-05-25 PROCEDURE — 3077F PR MOST RECENT SYSTOLIC BLOOD PRESSURE >= 140 MM HG: ICD-10-PCS | Mod: CPTII,S$GLB,, | Performed by: INTERNAL MEDICINE

## 2022-05-25 PROCEDURE — 99213 OFFICE O/P EST LOW 20 MIN: CPT | Mod: S$GLB,,, | Performed by: INTERNAL MEDICINE

## 2022-05-25 PROCEDURE — 99999 PR PBB SHADOW E&M-EST. PATIENT-LVL III: ICD-10-PCS | Mod: PBBFAC,,, | Performed by: INTERNAL MEDICINE

## 2022-05-25 PROCEDURE — 3008F BODY MASS INDEX DOCD: CPT | Mod: CPTII,S$GLB,, | Performed by: INTERNAL MEDICINE

## 2022-05-25 PROCEDURE — 4010F PR ACE/ARB THEARPY RXD/TAKEN: ICD-10-PCS | Mod: CPTII,S$GLB,, | Performed by: INTERNAL MEDICINE

## 2022-05-25 PROCEDURE — 3080F PR MOST RECENT DIASTOLIC BLOOD PRESSURE >= 90 MM HG: ICD-10-PCS | Mod: CPTII,S$GLB,, | Performed by: INTERNAL MEDICINE

## 2022-05-25 PROCEDURE — 3077F SYST BP >= 140 MM HG: CPT | Mod: CPTII,S$GLB,, | Performed by: INTERNAL MEDICINE

## 2022-05-25 PROCEDURE — 1159F MED LIST DOCD IN RCRD: CPT | Mod: CPTII,S$GLB,, | Performed by: INTERNAL MEDICINE

## 2022-05-25 PROCEDURE — 3008F PR BODY MASS INDEX (BMI) DOCUMENTED: ICD-10-PCS | Mod: CPTII,S$GLB,, | Performed by: INTERNAL MEDICINE

## 2022-05-25 NOTE — PROGRESS NOTES
Cardiology    5/25/2022  10:50 AM    Problem list  Patient Active Problem List   Diagnosis    Gastroesophageal reflux disease without esophagitis    Primary hypertension    Family history of colon cancer    Nonspecific abnormal electrocardiogram (ECG) (EKG)       CC:  f/u    HPI:  Is here for follow-up.  His nuclear stress test is negative for ischemia he does not monitor his blood pressure at home.  He just took his medications this morning and had breakfast.    Medications  Current Outpatient Medications   Medication Sig Dispense Refill    amLODIPine (NORVASC) 10 MG tablet Take 1 tablet (10 mg total) by mouth once daily. 90 tablet 1    dicyclomine (BENTYL) 20 mg tablet Take by mouth.      esomeprazole (NEXIUM) 20 MG capsule Take 40 mg by mouth before breakfast. Taking OTC      ibuprofen (ADVIL,MOTRIN) 800 MG tablet Take 800 mg by mouth 2 (two) times daily as needed.      losartan (COZAAR) 50 MG tablet Take 1 tablet (50 mg total) by mouth once daily. 90 tablet 1     No current facility-administered medications for this visit.      Prior to Admission medications    Medication Sig Start Date End Date Taking? Authorizing Provider   amLODIPine (NORVASC) 10 MG tablet Take 1 tablet (10 mg total) by mouth once daily. 3/30/22  Yes Gonzalo Pandey MD   dicyclomine (BENTYL) 20 mg tablet Take by mouth. 3/20/22  Yes Historical Provider   esomeprazole (NEXIUM) 20 MG capsule Take 40 mg by mouth before breakfast. Taking OTC   Yes Historical Provider   ibuprofen (ADVIL,MOTRIN) 800 MG tablet Take 800 mg by mouth 2 (two) times daily as needed. 9/30/21  Yes Historical Provider   losartan (COZAAR) 50 MG tablet Take 1 tablet (50 mg total) by mouth once daily. 3/30/22  Yes Gonzalo Pandey MD         History  Past Medical History:   Diagnosis Date    Essential (primary) hypertension     Gastroesophageal reflux disease without esophagitis     Tinea pedis of both feet      Past Surgical History:   Procedure  Laterality Date    NO PAST SURGERIES       Social History     Socioeconomic History    Marital status:    Tobacco Use    Smoking status: Never Smoker    Smokeless tobacco: Never Used   Substance and Sexual Activity    Alcohol use: Yes     Comment: occasionally (1-2 x  per week; 2 drinks max)    Drug use: Never    Sexual activity: Not Currently     Partners: Female     Birth control/protection: None   Social History Narrative    Tobacco: None    EtOH: 1-2x per week has 1-2 drinks     Drug: None    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   HENT: Negative for congestion and nosebleeds.    Eyes: Negative for double vision, vision loss in left eye, vision loss in right eye and visual disturbance.   Cardiovascular: Negative for chest pain, claudication, cyanosis, dyspnea on exertion, irregular heartbeat, leg swelling, near-syncope, orthopnea, palpitations, paroxysmal nocturnal dyspnea and syncope.   Respiratory: Negative for cough, hemoptysis, shortness of breath, sleep disturbances due to breathing, snoring, sputum production and wheezing.    Endocrine: Negative for cold intolerance and heat intolerance.   Skin: Negative for nail changes and rash.   Musculoskeletal: Negative for joint pain, muscle cramps, muscle weakness and myalgias.   Gastrointestinal: Negative for change in bowel habit, heartburn, hematemesis, hematochezia, hemorrhoids and melena.   Neurological: Negative for dizziness, focal weakness and headaches.         Physical Exam  Wt Readings from Last 1 Encounters:   05/25/22 111 kg (244 lb 11.2 oz)     BP Readings from Last 3 Encounters:   05/25/22  (!) 156/92   05/10/22 112/78   04/11/22 (!) 158/88     Pulse Readings from Last 1 Encounters:   05/25/22 97     Body mass index is 36.14 kg/m².    Physical Exam  Vitals reviewed.   Neck:      Vascular: No carotid bruit or JVD.   Cardiovascular:      Rate and Rhythm: Normal rate and regular rhythm.      Pulses:           Carotid pulses are 2+ on the right side and 2+ on the left side.       Radial pulses are 2+ on the right side and 2+ on the left side.      Heart sounds: Normal heart sounds, S1 normal and S2 normal.   Musculoskeletal:      Right lower leg: No edema.      Left lower leg: No edema.   Neurological:      Mental Status: He is alert.             Assessment  1. Nonspecific abnormal electrocardiogram (ECG) (EKG)  Stable    2. Primary hypertension  Not at goal        Plan and Discussion  Discussed that his blood pressure is not controlled.  Recommended to increase losartan to 100 mg daily.  However he does not want to make any changes at present and wants to continue losartan 50 mg daily.  Recommend to monitor blood pressure daily and follow-up with PCP.    Follow Up  PRN      Corey Dennis MD, F.A.C.C, F.S.C.A.I.

## 2022-09-14 ENCOUNTER — PATIENT OUTREACH (OUTPATIENT)
Dept: ADMINISTRATIVE | Facility: HOSPITAL | Age: 52
End: 2022-09-14
Payer: COMMERCIAL

## 2022-10-13 DIAGNOSIS — I10 PRIMARY HYPERTENSION: ICD-10-CM

## 2022-10-29 DIAGNOSIS — Z80.0 FAMILY HISTORY OF COLON CANCER: Primary | ICD-10-CM

## 2023-09-19 ENCOUNTER — LAB VISIT (OUTPATIENT)
Dept: PRIMARY CARE CLINIC | Facility: CLINIC | Age: 53
End: 2023-09-19
Payer: COMMERCIAL

## 2023-09-19 ENCOUNTER — OFFICE VISIT (OUTPATIENT)
Dept: PRIMARY CARE CLINIC | Facility: CLINIC | Age: 53
End: 2023-09-19
Payer: COMMERCIAL

## 2023-09-19 VITALS
SYSTOLIC BLOOD PRESSURE: 152 MMHG | BODY MASS INDEX: 36.85 KG/M2 | WEIGHT: 249.56 LBS | HEART RATE: 83 BPM | DIASTOLIC BLOOD PRESSURE: 86 MMHG

## 2023-09-19 DIAGNOSIS — M79.89 SWELLING OF LEFT HAND: Primary | ICD-10-CM

## 2023-09-19 DIAGNOSIS — Z00.00 ANNUAL PHYSICAL EXAM: ICD-10-CM

## 2023-09-19 DIAGNOSIS — E66.01 SEVERE OBESITY (BMI 35.0-39.9) WITH COMORBIDITY: ICD-10-CM

## 2023-09-19 DIAGNOSIS — I10 PRIMARY HYPERTENSION: ICD-10-CM

## 2023-09-19 DIAGNOSIS — M79.89 SWELLING OF LEFT HAND: ICD-10-CM

## 2023-09-19 LAB
ALBUMIN SERPL BCP-MCNC: 3.7 G/DL (ref 3.5–5.2)
ALP SERPL-CCNC: 73 U/L (ref 55–135)
ALT SERPL W/O P-5'-P-CCNC: 16 U/L (ref 10–44)
ANION GAP SERPL CALC-SCNC: 10 MMOL/L (ref 8–16)
AST SERPL-CCNC: 17 U/L (ref 10–40)
BILIRUB SERPL-MCNC: 0.8 MG/DL (ref 0.1–1)
BUN SERPL-MCNC: 19 MG/DL (ref 6–20)
CALCIUM SERPL-MCNC: 9.8 MG/DL (ref 8.7–10.5)
CHLORIDE SERPL-SCNC: 107 MMOL/L (ref 95–110)
CHOLEST SERPL-MCNC: 134 MG/DL (ref 120–199)
CHOLEST/HDLC SERPL: 3.8 {RATIO} (ref 2–5)
CO2 SERPL-SCNC: 23 MMOL/L (ref 23–29)
CREAT SERPL-MCNC: 1.4 MG/DL (ref 0.5–1.4)
EST. GFR  (NO RACE VARIABLE): 59.7 ML/MIN/1.73 M^2
ESTIMATED AVG GLUCOSE: 134 MG/DL (ref 68–131)
GLUCOSE SERPL-MCNC: 98 MG/DL (ref 70–110)
HBA1C MFR BLD: 6.3 % (ref 4–5.6)
HDLC SERPL-MCNC: 35 MG/DL (ref 40–75)
HDLC SERPL: 26.1 % (ref 20–50)
LDLC SERPL CALC-MCNC: 88 MG/DL (ref 63–159)
NONHDLC SERPL-MCNC: 99 MG/DL
POTASSIUM SERPL-SCNC: 4 MMOL/L (ref 3.5–5.1)
PROT SERPL-MCNC: 7.5 G/DL (ref 6–8.4)
SODIUM SERPL-SCNC: 140 MMOL/L (ref 136–145)
TRIGL SERPL-MCNC: 55 MG/DL (ref 30–150)
URATE SERPL-MCNC: 9.8 MG/DL (ref 3.4–7)

## 2023-09-19 PROCEDURE — 1159F PR MEDICATION LIST DOCUMENTED IN MEDICAL RECORD: ICD-10-PCS | Mod: CPTII,S$GLB,, | Performed by: FAMILY MEDICINE

## 2023-09-19 PROCEDURE — 80061 LIPID PANEL: CPT | Performed by: FAMILY MEDICINE

## 2023-09-19 PROCEDURE — 3008F BODY MASS INDEX DOCD: CPT | Mod: CPTII,S$GLB,, | Performed by: FAMILY MEDICINE

## 2023-09-19 PROCEDURE — 83036 HEMOGLOBIN GLYCOSYLATED A1C: CPT | Performed by: FAMILY MEDICINE

## 2023-09-19 PROCEDURE — 99999 PR PBB SHADOW E&M-EST. PATIENT-LVL III: CPT | Mod: PBBFAC,,, | Performed by: FAMILY MEDICINE

## 2023-09-19 PROCEDURE — 84550 ASSAY OF BLOOD/URIC ACID: CPT | Performed by: FAMILY MEDICINE

## 2023-09-19 PROCEDURE — 3077F PR MOST RECENT SYSTOLIC BLOOD PRESSURE >= 140 MM HG: ICD-10-PCS | Mod: CPTII,S$GLB,, | Performed by: FAMILY MEDICINE

## 2023-09-19 PROCEDURE — 4010F ACE/ARB THERAPY RXD/TAKEN: CPT | Mod: CPTII,S$GLB,, | Performed by: FAMILY MEDICINE

## 2023-09-19 PROCEDURE — 80053 COMPREHEN METABOLIC PANEL: CPT | Performed by: FAMILY MEDICINE

## 2023-09-19 PROCEDURE — 3079F DIAST BP 80-89 MM HG: CPT | Mod: CPTII,S$GLB,, | Performed by: FAMILY MEDICINE

## 2023-09-19 PROCEDURE — 3008F PR BODY MASS INDEX (BMI) DOCUMENTED: ICD-10-PCS | Mod: CPTII,S$GLB,, | Performed by: FAMILY MEDICINE

## 2023-09-19 PROCEDURE — 99214 OFFICE O/P EST MOD 30 MIN: CPT | Mod: S$GLB,,, | Performed by: FAMILY MEDICINE

## 2023-09-19 PROCEDURE — 99214 PR OFFICE/OUTPT VISIT, EST, LEVL IV, 30-39 MIN: ICD-10-PCS | Mod: S$GLB,,, | Performed by: FAMILY MEDICINE

## 2023-09-19 PROCEDURE — 99999 PR PBB SHADOW E&M-EST. PATIENT-LVL III: ICD-10-PCS | Mod: PBBFAC,,, | Performed by: FAMILY MEDICINE

## 2023-09-19 PROCEDURE — 36415 COLL VENOUS BLD VENIPUNCTURE: CPT | Performed by: FAMILY MEDICINE

## 2023-09-19 PROCEDURE — 1159F MED LIST DOCD IN RCRD: CPT | Mod: CPTII,S$GLB,, | Performed by: FAMILY MEDICINE

## 2023-09-19 PROCEDURE — 4010F PR ACE/ARB THEARPY RXD/TAKEN: ICD-10-PCS | Mod: CPTII,S$GLB,, | Performed by: FAMILY MEDICINE

## 2023-09-19 PROCEDURE — 3077F SYST BP >= 140 MM HG: CPT | Mod: CPTII,S$GLB,, | Performed by: FAMILY MEDICINE

## 2023-09-19 PROCEDURE — 3079F PR MOST RECENT DIASTOLIC BLOOD PRESSURE 80-89 MM HG: ICD-10-PCS | Mod: CPTII,S$GLB,, | Performed by: FAMILY MEDICINE

## 2023-09-19 RX ORDER — NAPROXEN 500 MG/1
500 TABLET ORAL 2 TIMES DAILY PRN
Qty: 60 TABLET | Refills: 3 | Status: SHIPPED | OUTPATIENT
Start: 2023-09-19 | End: 2023-10-12

## 2023-09-19 RX ORDER — LOSARTAN POTASSIUM 50 MG/1
50 TABLET ORAL DAILY
Qty: 90 TABLET | Refills: 1 | Status: SHIPPED | OUTPATIENT
Start: 2023-09-19

## 2023-09-19 NOTE — PROGRESS NOTES
Clinic Note  9/19/2023      Subjective:       Patient ID:  Alfa is a 53 y.o. male being seen for an established visit.    Chief Complaint: Left elbow and L hand pain/swelling     Left hand swelling and left elbow swelling-over last week patient reports left hand and left elbow pain and swelling.  Patient has been taking ibuprofen consistently over the course of 4 days which have helped with the pain and some of the swelling.  However still having residual swelling, also using cold compresses.  Denies any fever, chills, rashes.  No swelling of any other joints    Hypertension-is prescribed losartan and amlodipine.  However has ED with these medications so not taking the medication anymore.  Denies any chest pain or dyspnea.      Review of Systems   Constitutional:  Negative for chills, fever, malaise/fatigue and weight loss.   HENT:  Negative for congestion, sinus pain and sore throat.    Respiratory:  Negative for cough, shortness of breath and wheezing.    Cardiovascular:  Negative for chest pain and palpitations.   Gastrointestinal:  Negative for constipation, diarrhea, nausea and vomiting.   Genitourinary:  Negative for dysuria, frequency and urgency.   Musculoskeletal:  Negative for myalgias.   Skin:  Negative for rash.   Neurological:  Negative for headaches.       Medication List with Changes/Refills   Current Medications    AMLODIPINE (NORVASC) 10 MG TABLET    Take 1 tablet (10 mg total) by mouth once daily.    DICYCLOMINE (BENTYL) 20 MG TABLET    Take by mouth.    ESOMEPRAZOLE (NEXIUM) 20 MG CAPSULE    Take 40 mg by mouth before breakfast. Taking OTC    IBUPROFEN (ADVIL,MOTRIN) 800 MG TABLET    Take 800 mg by mouth 2 (two) times daily as needed.    LOSARTAN (COZAAR) 50 MG TABLET    Take 1 tablet (50 mg total) by mouth once daily.       Patient Active Problem List   Diagnosis    Gastroesophageal reflux disease without esophagitis    Primary hypertension    Family history of colon cancer    Nonspecific  "abnormal electrocardiogram (ECG) (EKG)           Objective:      BP (!) 152/86 (BP Location: Right arm, Patient Position: Sitting)   Pulse 83   Wt 113.2 kg (249 lb 9 oz)   BMI 36.85 kg/m²   Estimated body mass index is 36.85 kg/m² as calculated from the following:    Height as of 5/25/22: 5' 9" (1.753 m).    Weight as of this encounter: 113.2 kg (249 lb 9 oz).  Physical Exam  Vitals reviewed.   Constitutional:       General: He is not in acute distress.     Appearance: He is obese. He is not diaphoretic.   HENT:      Head: Normocephalic and atraumatic.   Eyes:      Conjunctiva/sclera: Conjunctivae normal.   Cardiovascular:      Rate and Rhythm: Normal rate and regular rhythm.      Heart sounds: Normal heart sounds.   Pulmonary:      Effort: Pulmonary effort is normal. No respiratory distress.      Breath sounds: Normal breath sounds. No wheezing.   Abdominal:      General: Bowel sounds are normal.      Palpations: Abdomen is soft.   Musculoskeletal:         General: Normal range of motion.        Hands:       Cervical back: Normal range of motion.   Skin:     General: Skin is warm and dry.      Findings: No erythema or rash.   Neurological:      Mental Status: He is alert and oriented to person, place, and time.   Psychiatric:         Mood and Affect: Mood and affect normal.         Behavior: Behavior normal.         Thought Content: Thought content normal.         Judgment: Judgment normal.         Assessment and Plan:     1. Swelling of left hand  - symptoms suspicious for gout, lower suspicion for infectious etiology, osteoarthritis  - naproxen (NAPROSYN) 500 MG tablet; Take 1 tablet (500 mg total) by mouth 2 (two) times daily as needed (pain (take with food. do not take together with ibuprofen)).  Dispense: 60 tablet; Refill: 3  - Uric Acid; Future    2. Primary hypertension  - blood pressure not controlled but patient concerned about ED side effects, will restart patient on losartan, patient will defer " amlodipine at this time.  Patient come back in 2 weeks for nursing blood pressure check and BMP  - losartan (COZAAR) 50 MG tablet; Take 1 tablet (50 mg total) by mouth once daily. High blood pressure  Dispense: 90 tablet; Refill: 1    4. Annual physical exam  - Comprehensive Metabolic Panel; Future  - Lipid Panel; Future  - Hemoglobin A1C; Future        Follow Up:   No follow-ups on file.    Other Orders Placed This Visit:  No orders of the defined types were placed in this encounter.        Uri Dennis MD        This note is dictated on M*Modal word recognition program.  There are word recognition mistakes that are occasionally missed on review.

## 2023-10-05 DIAGNOSIS — E78.5 HYPERLIPIDEMIA, UNSPECIFIED HYPERLIPIDEMIA TYPE: Primary | ICD-10-CM

## 2023-10-05 RX ORDER — ROSUVASTATIN CALCIUM 10 MG/1
10 TABLET, COATED ORAL DAILY
Qty: 90 TABLET | Refills: 3 | Status: SHIPPED | OUTPATIENT
Start: 2023-10-05 | End: 2024-10-04

## 2023-10-12 ENCOUNTER — OFFICE VISIT (OUTPATIENT)
Dept: PRIMARY CARE CLINIC | Facility: CLINIC | Age: 53
End: 2023-10-12
Payer: COMMERCIAL

## 2023-10-12 VITALS
OXYGEN SATURATION: 98 % | DIASTOLIC BLOOD PRESSURE: 81 MMHG | HEART RATE: 91 BPM | HEIGHT: 69 IN | SYSTOLIC BLOOD PRESSURE: 137 MMHG | WEIGHT: 245.06 LBS | BODY MASS INDEX: 36.3 KG/M2

## 2023-10-12 DIAGNOSIS — M10.9 ACUTE GOUT OF MULTIPLE SITES, UNSPECIFIED CAUSE: ICD-10-CM

## 2023-10-12 DIAGNOSIS — I10 PRIMARY HYPERTENSION: ICD-10-CM

## 2023-10-12 DIAGNOSIS — K92.2 ACUTE GI BLEEDING: ICD-10-CM

## 2023-10-12 DIAGNOSIS — Z09 HOSPITAL DISCHARGE FOLLOW-UP: Primary | ICD-10-CM

## 2023-10-12 PROCEDURE — 3044F HG A1C LEVEL LT 7.0%: CPT | Mod: CPTII,S$GLB,, | Performed by: NURSE PRACTITIONER

## 2023-10-12 PROCEDURE — 3079F DIAST BP 80-89 MM HG: CPT | Mod: CPTII,S$GLB,, | Performed by: NURSE PRACTITIONER

## 2023-10-12 PROCEDURE — 99496 TRANSITIONAL CARE MANAGE SERVICE 7 DAY DISCHARGE: ICD-10-PCS | Mod: S$GLB,,, | Performed by: NURSE PRACTITIONER

## 2023-10-12 PROCEDURE — 99999 PR PBB SHADOW E&M-EST. PATIENT-LVL IV: CPT | Mod: PBBFAC,,, | Performed by: NURSE PRACTITIONER

## 2023-10-12 PROCEDURE — 1159F PR MEDICATION LIST DOCUMENTED IN MEDICAL RECORD: ICD-10-PCS | Mod: CPTII,S$GLB,, | Performed by: NURSE PRACTITIONER

## 2023-10-12 PROCEDURE — 1159F MED LIST DOCD IN RCRD: CPT | Mod: CPTII,S$GLB,, | Performed by: NURSE PRACTITIONER

## 2023-10-12 PROCEDURE — 4010F PR ACE/ARB THEARPY RXD/TAKEN: ICD-10-PCS | Mod: CPTII,S$GLB,, | Performed by: NURSE PRACTITIONER

## 2023-10-12 PROCEDURE — 3075F PR MOST RECENT SYSTOLIC BLOOD PRESS GE 130-139MM HG: ICD-10-PCS | Mod: CPTII,S$GLB,, | Performed by: NURSE PRACTITIONER

## 2023-10-12 PROCEDURE — 4010F ACE/ARB THERAPY RXD/TAKEN: CPT | Mod: CPTII,S$GLB,, | Performed by: NURSE PRACTITIONER

## 2023-10-12 PROCEDURE — 3044F PR MOST RECENT HEMOGLOBIN A1C LEVEL <7.0%: ICD-10-PCS | Mod: CPTII,S$GLB,, | Performed by: NURSE PRACTITIONER

## 2023-10-12 PROCEDURE — 99999 PR PBB SHADOW E&M-EST. PATIENT-LVL IV: ICD-10-PCS | Mod: PBBFAC,,, | Performed by: NURSE PRACTITIONER

## 2023-10-12 PROCEDURE — 99496 TRANSJ CARE MGMT HIGH F2F 7D: CPT | Mod: S$GLB,,, | Performed by: NURSE PRACTITIONER

## 2023-10-12 PROCEDURE — 3079F PR MOST RECENT DIASTOLIC BLOOD PRESSURE 80-89 MM HG: ICD-10-PCS | Mod: CPTII,S$GLB,, | Performed by: NURSE PRACTITIONER

## 2023-10-12 PROCEDURE — 3075F SYST BP GE 130 - 139MM HG: CPT | Mod: CPTII,S$GLB,, | Performed by: NURSE PRACTITIONER

## 2023-10-12 RX ORDER — OMEPRAZOLE 40 MG/1
40 CAPSULE, DELAYED RELEASE ORAL 2 TIMES DAILY
COMMUNITY
Start: 2023-10-07

## 2023-10-12 RX ORDER — COLCHICINE 0.6 MG/1
0.6 TABLET ORAL
COMMUNITY
Start: 2023-10-11 | End: 2023-12-07

## 2023-10-12 NOTE — PROGRESS NOTES
Subjective:       Patient ID: Alfa Byrnes Sr is a 53 y.o. male.    Transitional Care Note    Family and/or Caretaker present at visit?  No.  Diagnostic tests reviewed/disposition: No diagnosic tests pending after this hospitalization.  Disease/illness education: Yes  Home health/community services discussion/referrals: Patient does not have home health established from hospital visit.  They do not need home health.  If needed, we will set up home health for the patient.   Establishment or re-establishment of referral orders for community resources: No other necessary community resources.   Discussion with other health care providers: No discussion with other health care providers necessary.              Chief Complaint: Hospital Follow Up    Mr. lAfa Byrnes is a 53 year old male, new to me, presents to the clinic for hospital discharge follow up. Former patient of Gonzalo Pandey.     [facility]  Date of admission: 10/8/2023  Date of discharge: 10/11/23    Presented to emergency room with complaints bright red blood per rectum and syncope.  Prior admission 10/06 EGD revealed erosive gastropathy and duodenal erosion.  During recent encounter, Protonix drip initiated, 2 units PRBCs administered, colonoscopy 10/09/2023 evidence of blood and noted diverticulosis.  Discharged on omeprazole 40 mg, 2 times daily, instructed to avoid NSAIDs, previously prescribed for gout, in lieu of GI bleed.      Past Medical History:   Diagnosis Date    Essential (primary) hypertension     Gastroesophageal reflux disease without esophagitis     Tinea pedis of both feet        Patient Active Problem List   Diagnosis    Gastroesophageal reflux disease without esophagitis    Primary hypertension    Family history of colon cancer    Nonspecific abnormal electrocardiogram (ECG) (EKG)    Severe obesity (BMI 35.0-39.9) with comorbidity       Past Surgical History:   Procedure Laterality Date    NO PAST SURGERIES         Family History  "  Problem Relation Age of Onset    Hypertension Mother     Diabetes type II Mother     Colon cancer Father          at 51 yo       Social History     Tobacco Use   Smoking Status Never   Smokeless Tobacco Never       Social History     Social History Narrative    Tobacco: None    EtOH: 1-2x per week has 1-2 drinks     Drug: None    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     Review of patient's allergies indicates:   Allergen Reactions    Penicillins Hives    Naproxen Other (See Comments)     Blood in stool; syncope         Review of Systems      Objective:        /81 (BP Location: Left arm, Patient Position: Sitting, BP Method: Large (Automatic))   Pulse 91   Ht 5' 9" (1.753 m)   Wt 111.1 kg (245 lb 0.7 oz)   SpO2 98%   BMI 36.19 kg/m²      Physical Exam  Constitutional:       Appearance: Normal appearance.   Pulmonary:      Effort: Pulmonary effort is normal. No respiratory distress.   Abdominal:      General: Bowel sounds are normal. There is no distension.      Tenderness: There is no abdominal tenderness. There is no guarding.   Musculoskeletal:         General: Normal range of motion.      Cervical back: Normal range of motion.   Skin:     General: Skin is warm and dry.      Capillary Refill: Capillary refill takes less than 2 seconds.   Neurological:      Mental Status: He is alert and oriented to person, place, and time.   Psychiatric:         Mood and Affect: Affect is flat.         Behavior: Behavior is agitated.         Assessment:       1. Hospital discharge follow-up    2. Acute GI bleeding    3. Primary hypertension    4. Acute gout of multiple sites, unspecified cause        Plan:       Alfa was seen today for hospital follow up.    Diagnoses and all orders for this visit:    Hospital discharge " follow-up  Stable since discharge.      Acute GI bleeding  Stable since discharge.    Refuses referral to Gastroenterology enterology at [facility] at this time states he will continue to be managed by Gastroenterology Department at [facility].  Denies any further signs/symptoms of GI bleed.    Omeprazole 40 mg, 2 times daily.      Primary hypertension  Controlled with losartan.    Denies headache, shortness of breath, chest pain/discomfort. Swelling.    Acute gout of multiple sites, unspecified cause  Stable with colchicine.  Instructed to take 1-2 tabs per day during flare up, until resolved.    Follow up if symptoms worsen or fail to improve.    Marce Mijares APRN, MSN, FNP-C

## 2023-12-07 ENCOUNTER — OFFICE VISIT (OUTPATIENT)
Dept: URGENT CARE | Facility: CLINIC | Age: 53
End: 2023-12-07
Payer: COMMERCIAL

## 2023-12-07 VITALS
SYSTOLIC BLOOD PRESSURE: 173 MMHG | HEIGHT: 69 IN | RESPIRATION RATE: 15 BRPM | WEIGHT: 244.94 LBS | DIASTOLIC BLOOD PRESSURE: 106 MMHG | TEMPERATURE: 99 F | OXYGEN SATURATION: 98 % | BODY MASS INDEX: 36.28 KG/M2 | HEART RATE: 88 BPM

## 2023-12-07 DIAGNOSIS — I10 UNCONTROLLED HYPERTENSION: ICD-10-CM

## 2023-12-07 DIAGNOSIS — M10.072 ACUTE IDIOPATHIC GOUT INVOLVING TOE OF LEFT FOOT: Primary | ICD-10-CM

## 2023-12-07 PROCEDURE — 99214 PR OFFICE/OUTPT VISIT, EST, LEVL IV, 30-39 MIN: ICD-10-PCS | Mod: S$GLB,,, | Performed by: FAMILY MEDICINE

## 2023-12-07 PROCEDURE — 99214 OFFICE O/P EST MOD 30 MIN: CPT | Mod: S$GLB,,, | Performed by: FAMILY MEDICINE

## 2023-12-07 RX ORDER — COLCHICINE 0.6 MG/1
TABLET ORAL
Qty: 30 TABLET | Refills: 11 | Status: SHIPPED | OUTPATIENT
Start: 2023-12-07

## 2023-12-07 NOTE — PROGRESS NOTES
"Subjective:      Patient ID: Alfa Byrnes Sr is a 53 y.o. male.    Vitals:  height is 5' 9" (1.753 m) and weight is 111.1 kg (244 lb 14.9 oz). His oral temperature is 98.6 °F (37 °C). His blood pressure is 173/106 (abnormal) and his pulse is 88. His respiration is 15 and oxygen saturation is 98%.     Chief Complaint: Medication Refill    Pt presents to  for refill of colcrys 0.6 mg tablet for gout. Pt is also have a flare up on left greater toe. Flare up began 2 days ago. Pt has not taken his medication for hypertension today.  Antihypertensives at home now.  Denies any chest pain or shortness a breath.  If symptomatic hypotension uncontrolled.    As 1st gout he only uses the medication as needed.  It was prescribed for once daily use for prophylaxis.  He states even without medication he has wanted to gout flares per year.  Patient more interested in p.r.n. dosing.    Medication Refill    ROS   See HPI for pertinent positives and negatives        Objective:     Physical Exam  Constitutional: Pt oriented to person, place, and time.  Non-toxic appearance.   Patient does not appear ill. No distress. normal  HENT: No icterus or facial swelling appreciated  Head: Normocephalic and atraumatic.   Nose: No congestion.   Pulmonary/Chest: Effort normal. No stridor. No respiratory distress.   Abdominal: Normal appearance. Abdomen exhibits no distension.   Musculoskeletal:         General: No swelling.   Neurological: no focal deficit. Patient is alert and oriented to person, place, and time.   Skin: Skin is not diaphoretic and not pale. no jaundice  Psychiatric: Patients behavior is normal. Mood, judgment and thought content normal.     Assessment:     1. Acute idiopathic gout involving toe of left foot    2. Uncontrolled hypertension        Plan:       Acute idiopathic gout involving toe of left foot  -     colchicine (COLCRYS) 0.6 mg tablet; 2 tabs once then 1 tab one hour later on day one of flare and then 1 " tab daily until flare resolves  Dispense: 30 tablet; Refill: 11    Uncontrolled hypertension

## 2024-07-13 DIAGNOSIS — I10 PRIMARY HYPERTENSION: ICD-10-CM

## 2024-07-13 NOTE — TELEPHONE ENCOUNTER
Refill Routing Note   Medication(s) are not appropriate for processing by [facility] for the following reason(s):        Required vitals abnormal    [facility] action(s):  Defer               Appointments  past 12m or future 3m with PCP    Date Provider   Last Visit   9/19/2023 Uri Dennis MD   Next Visit   Visit date not found Uri Dennis MD   ED visits in past 90 days: 0        Note composed:9:24 AM 07/13/2024

## 2024-07-16 RX ORDER — LOSARTAN POTASSIUM 50 MG/1
TABLET ORAL
Qty: 90 TABLET | Refills: 0 | Status: SHIPPED | OUTPATIENT
Start: 2024-07-16

## 2024-12-27 ENCOUNTER — TELEPHONE (OUTPATIENT)
Dept: PRIMARY CARE CLINIC | Facility: CLINIC | Age: 54
End: 2024-12-27
Payer: COMMERCIAL

## 2024-12-27 ENCOUNTER — OFFICE VISIT (OUTPATIENT)
Dept: PRIMARY CARE CLINIC | Facility: CLINIC | Age: 54
End: 2024-12-27
Payer: COMMERCIAL

## 2024-12-27 VITALS
BODY MASS INDEX: 35.62 KG/M2 | HEART RATE: 96 BPM | HEIGHT: 69 IN | WEIGHT: 240.5 LBS | OXYGEN SATURATION: 98 % | DIASTOLIC BLOOD PRESSURE: 110 MMHG | SYSTOLIC BLOOD PRESSURE: 190 MMHG

## 2024-12-27 DIAGNOSIS — M10.9 ACUTE GOUT OF MULTIPLE SITES, UNSPECIFIED CAUSE: Primary | ICD-10-CM

## 2024-12-27 DIAGNOSIS — I10 UNCONTROLLED HYPERTENSION: ICD-10-CM

## 2024-12-27 PROCEDURE — 99999 PR PBB SHADOW E&M-EST. PATIENT-LVL III: CPT | Mod: PBBFAC,,, | Performed by: FAMILY MEDICINE

## 2024-12-27 RX ORDER — COLCHICINE 0.6 MG/1
TABLET ORAL
Qty: 30 TABLET | Refills: 0 | Status: SHIPPED | OUTPATIENT
Start: 2024-12-27 | End: 2024-12-27 | Stop reason: SDUPTHER

## 2024-12-27 RX ORDER — COLCHICINE 0.6 MG/1
TABLET ORAL
Qty: 30 TABLET | Refills: 0 | Status: SHIPPED | OUTPATIENT
Start: 2024-12-27

## 2024-12-27 RX ORDER — LOSARTAN POTASSIUM 50 MG/1
100 TABLET ORAL DAILY
Qty: 30 TABLET | Refills: 0 | Status: SHIPPED | OUTPATIENT
Start: 2024-12-27

## 2024-12-27 NOTE — PROGRESS NOTES
Subjective:       Patient ID: Alfa Byrnes Sr is a 54 y.o. male.    Chief Complaint: Medication Refill (Colchicine) and Hand Pain (Left hand and wrist)    History of Present Illness    CHIEF COMPLAINT:  Patient presents today for a checkup and refills on blood pressure medication.    HYPERTENSION:  He has a history of hypertension since teenage years. Blood pressure has been significantly elevated over the past year with readings greater than 160/100. He was previously taking Losartan but is currently not on any antihypertensive medications. He acknowledges current blood pressure management is insufficient.    GOUT:  He has had gout for 3-4 years, affecting the left wrist and right foot. He was previously taking colchicine but was denied a refill. He has used Advil for pain relief. He previously used naproxen which resulted in a GI bleed, leading to immediate discontinuation.    MEDICATIONS:  He was taking rosuvastatin for cholesterol management.          Review of patient's allergies indicates:   Allergen Reactions    Penicillins Hives    Naproxen Other (See Comments)     Blood in stool; syncope          Current Outpatient Medications:     dicyclomine (BENTYL) 20 mg tablet, Take by mouth., Disp: , Rfl:     omeprazole (PRILOSEC) 40 MG capsule, Take 40 mg by mouth 2 (two) times daily., Disp: , Rfl:     rosuvastatin (CRESTOR) 10 MG tablet, Take 1 tablet (10 mg total) by mouth once daily. High cholesterol, Disp: 90 tablet, Rfl: 3    colchicine (COLCRYS) 0.6 mg tablet, 2 tabs once then 1 tab one hour later on day one of flare and then 1 tab twice daily until flare resolves, Disp: 30 tablet, Rfl: 0    losartan (COZAAR) 50 MG tablet, Take 2 tablets (100 mg total) by mouth once daily., Disp: 30 tablet, Rfl: 0    Past Medical History:   Diagnosis Date    Essential (primary) hypertension     Gastroesophageal reflux disease without esophagitis     Tinea pedis of both feet       Past Surgical History:   Procedure  Laterality Date    NO PAST SURGERIES        Social History     Socioeconomic History    Marital status:    Tobacco Use    Smoking status: Never    Smokeless tobacco: Never   Substance and Sexual Activity    Alcohol use: Yes     Comment: occasionally (1-2 x  per week; 2 drinks max)    Drug use: Never    Sexual activity: Not Currently     Partners: Female     Birth control/protection: None   Social History Narrative    Tobacco: None    EtOH: 1-2x per week has 1-2 drinks     Drug: None    Employment: Security/Patrol work    Education: 3 years of college    Lives with wife and 23 yo daughter     19 yo son is studying at [employer]    26 yo son in [place], [place]      Family History   Problem Relation Name Age of Onset    Hypertension Mother      Diabetes type II Mother      Colon cancer Father           at 49 yo     Review of Systems    Objective:      Physical Exam    Vitals: Blood pressure is 190/110.  General: No acute distress. Well-developed. Well-nourished.  Ext:  Moderate swelling and increased warmth of left hand and wrist.           Assessment:       1. Acute gout of multiple sites, unspecified cause    2. Uncontrolled hypertension        Plan:         Alfa was seen today for medication refill and hand pain.    Diagnoses and all orders for this visit:    Acute gout of multiple sites, unspecified cause  Acute exacerbation of gout, refill for colchicine given  -     colchicine (COLCRYS) 0.6 mg tablet; 2 tabs once then 1 tab one hour later on day one of flare and then 1 tab twice daily until flare resolves    Uncontrolled hypertension  Patient with uncontrolled HTN, exacerbated by pain.  Advised to be compliant with medication and follow up with new PCP.  -     losartan (COZAAR) 50 MG tablet; Take 2 tablets (100 mg total) by mouth once daily.        Assessment & Plan    HYPERTENSION:  - Educated on risks associated with uncontrolled hypertension, including stroke, kidney disease, and potential for  dialysis.  - Refilled losartan for hypertension, 30-day supply.    GOUT:  - Discussed mechanism of action for colchicine and steroids in treating gout.  - Started colchicine for gout flare: 2 tablets in the first hour, then 1 tablet 1 hour later, followed by 1 tablet twice daily until symptoms resolve.    PAIN MANAGEMENT AND GI HEALTH:  - Explained difference between NSAIDs and acetaminophen regarding GI bleeding risk.  - Patient to avoid NSAIDs (e.g., Advil, Aleve, ibuprofen) due to risk of GI bleeding.  - Patient to use Tylenol arthritis for pain management instead of NSAIDs.  - Started Tylenol arthritis for pain management.    FOLLOW-UP:  - Follow up with Dr. Olivera for comprehensive hypertension management and potential adjustment of blood pressure medications.          Valentina Stafford MD        This note was generated with the assistance of ambient listening technology. Verbal consent was obtained by the patient and accompanying visitor(s) for the recording of patient appointment to facilitate this note. I attest to having reviewed and edited the generated note for accuracy, though some syntax or spelling errors may persist. Please contact the author of this note for any clarification.

## 2024-12-27 NOTE — TELEPHONE ENCOUNTER
----- Message from Susan sent at 12/27/2024  8:35 AM CST -----  Contact: [phone]  Requesting an RX refill or new RX.    Is this a refill or new RX: refill    RX name and strength (copy/paste from chart):  colchicine (COLCRYS) 0.6 mg tablet    Is this a 30 day or 90 day RX: 30    Pharmacy name and phone # (copy/paste from chart):    [id number] - [place] [id number] [facility] [address] [place] [id number] [facility] [address]  Phone: [phone] Fax: [phone]      Please call pt and advise

## 2025-01-08 DIAGNOSIS — I10 UNCONTROLLED HYPERTENSION: ICD-10-CM

## 2025-01-08 RX ORDER — LOSARTAN POTASSIUM 100 MG/1
100 TABLET ORAL DAILY
Qty: 30 TABLET | Refills: 0 | Status: SHIPPED | OUTPATIENT
Start: 2025-01-08 | End: 2026-01-08

## 2025-01-08 NOTE — TELEPHONE ENCOUNTER
Refill Routing Note   Medication(s) are not appropriate for processing by [facility] for the following reason(s):        Non-participating provider  Required labs outdated  Required vitals abnormal    [facility] action(s):  Route      Medication Therapy Plan: Listed PCP is NPP, routing to last presribing physician      Appointments  past 12m or future 3m with PCP    Date Provider   Last Visit   3/30/2022 Gonzalo Pandey MD   Next Visit   Visit date not found Gonzalo Pandey MD   ED visits in past 90 days: 0        Note composed:12:28 PM 01/08/2025

## 2025-02-11 DIAGNOSIS — I10 UNCONTROLLED HYPERTENSION: ICD-10-CM

## 2025-02-11 RX ORDER — LOSARTAN POTASSIUM 100 MG/1
TABLET ORAL
Qty: 30 TABLET | Refills: 0 | Status: SHIPPED | OUTPATIENT
Start: 2025-02-11 | End: 2025-03-13

## 2025-02-11 NOTE — TELEPHONE ENCOUNTER
Refill Routing Note   Medication(s) are not appropriate for processing by [facility] for the following reason(s):        Responsible provider unclear  Required vitals abnormal  Required labs outdated    [facility] action(s):  Defer               Appointments  past 12m or future 3m with PCP    Date Provider   Last Visit   12/27/2024 Valentina Stafford MD   Next Visit   Visit date not found Valentina Stafford MD   ED visits in past 90 days: 0        Note composed:7:36 AM 02/11/2025

## 2025-02-26 ENCOUNTER — TELEPHONE (OUTPATIENT)
Dept: PRIMARY CARE CLINIC | Facility: CLINIC | Age: 55
End: 2025-02-26
Payer: COMMERCIAL

## 2025-07-10 ENCOUNTER — TELEPHONE (OUTPATIENT)
Dept: PRIMARY CARE CLINIC | Facility: CLINIC | Age: 55
End: 2025-07-10
Payer: COMMERCIAL

## 2025-07-10 NOTE — TELEPHONE ENCOUNTER
Copied from CRM #[id number]. Topic: General Inquiry - Patient Advice  >> Jul 10, 2025  9:38 AM Kimberly wrote:  .[email or web address]     Patient is calling for Medical Advice regarding:speak to the office in regards to the pt     How long has patient had these symptoms:    Pharmacy name and phone#:    Patient wants a call back or thru [facility], provide patient's call back phone number:  Sarah pts wife [phone]  Comments:  She states the pt is having some issues and she states he will not want to come in so she is asking to speak to the dr in regards to his issues   Please advise patient replies from provider may take up to 48 hours.

## 2025-07-10 NOTE — TELEPHONE ENCOUNTER
Please let patient know that he will need an appointment to discuss with the provider, can be virtual.

## 2025-07-24 NOTE — TELEPHONE ENCOUNTER
Patient scheduled for a sooner appointment and as an established patient for 07/30/2025 @ 3:40 PM with Dr. Stafford.

## 2025-07-30 ENCOUNTER — PATIENT MESSAGE (OUTPATIENT)
Dept: PRIMARY CARE CLINIC | Facility: CLINIC | Age: 55
End: 2025-07-30